# Patient Record
Sex: FEMALE | Race: WHITE | NOT HISPANIC OR LATINO
[De-identification: names, ages, dates, MRNs, and addresses within clinical notes are randomized per-mention and may not be internally consistent; named-entity substitution may affect disease eponyms.]

---

## 2018-01-23 ENCOUNTER — APPOINTMENT (OUTPATIENT)
Dept: ENDOCRINOLOGY | Facility: CLINIC | Age: 68
End: 2018-01-23
Payer: COMMERCIAL

## 2018-01-23 VITALS
HEIGHT: 64 IN | OXYGEN SATURATION: 98 % | WEIGHT: 125 LBS | HEART RATE: 70 BPM | DIASTOLIC BLOOD PRESSURE: 80 MMHG | SYSTOLIC BLOOD PRESSURE: 120 MMHG | BODY MASS INDEX: 21.34 KG/M2

## 2018-01-23 DIAGNOSIS — Z87.891 PERSONAL HISTORY OF NICOTINE DEPENDENCE: ICD-10-CM

## 2018-01-23 DIAGNOSIS — G43.909 MIGRAINE, UNSPECIFIED, NOT INTRACTABLE, W/OUT STATUS MIGRAINOSUS: ICD-10-CM

## 2018-01-23 DIAGNOSIS — Z82.49 FAMILY HISTORY OF ISCHEMIC HEART DISEASE AND OTHER DISEASES OF THE CIRCULATORY SYSTEM: ICD-10-CM

## 2018-01-23 PROCEDURE — 99244 OFF/OP CNSLTJ NEW/EST MOD 40: CPT

## 2018-01-23 RX ORDER — RIZATRIPTAN BENZOATE 10 MG/1
TABLET ORAL
Refills: 0 | Status: ACTIVE | COMMUNITY

## 2018-01-23 RX ORDER — PNV NO.95/FERROUS FUM/FOLIC AC 28MG-0.8MG
TABLET ORAL
Refills: 0 | Status: ACTIVE | COMMUNITY

## 2021-07-14 ENCOUNTER — APPOINTMENT (OUTPATIENT)
Dept: ENDOCRINOLOGY | Facility: CLINIC | Age: 71
End: 2021-07-14
Payer: MEDICARE

## 2021-07-14 VITALS
SYSTOLIC BLOOD PRESSURE: 142 MMHG | OXYGEN SATURATION: 98 % | HEART RATE: 78 BPM | WEIGHT: 126 LBS | HEIGHT: 64 IN | BODY MASS INDEX: 21.51 KG/M2 | TEMPERATURE: 98.1 F | DIASTOLIC BLOOD PRESSURE: 62 MMHG

## 2021-07-14 PROCEDURE — 99204 OFFICE O/P NEW MOD 45 MIN: CPT

## 2021-07-15 NOTE — REASON FOR VISIT
[Follow - Up] : a follow-up visit [Osteoporosis] : osteoporosis [FreeTextEntry2] : Anderson Fisher MD

## 2021-07-15 NOTE — PHYSICAL EXAM
[Alert] : alert [Well Nourished] : well nourished [No Acute Distress] : no acute distress [Well Developed] : well developed [Normal Sclera/Conjunctiva] : normal sclera/conjunctiva [EOMI] : extra ocular movement intact [No Proptosis] : no proptosis [Thyroid Not Enlarged] : the thyroid was not enlarged [No Thyroid Nodules] : no palpable thyroid nodules [Clear to Auscultation] : lungs were clear to auscultation bilaterally [Normal S1, S2] : normal S1 and S2 [Normal Rate] : heart rate was normal [Regular Rhythm] : with a regular rhythm [No Edema] : no peripheral edema [Normal Bowel Sounds] : normal bowel sounds [Not Tender] : non-tender [Not Distended] : not distended [Soft] : abdomen soft [Normal Anterior Cervical Nodes] : no anterior cervical lymphadenopathy [No Spinal Tenderness] : no spinal tenderness [Spine Straight] : spine straight [No Stigmata of Cushings Syndrome] : no stigmata of Cushings Syndrome [Normal Gait] : normal gait [Normal Reflexes] : deep tendon reflexes were 2+ and symmetric [No Tremors] : no tremors [Oriented x3] : oriented to person, place, and time [de-identified] : 2/6 systolic murmur

## 2021-07-15 NOTE — ASSESSMENT
[Bisphosphonate Therapy] : Risks  and benefits of bisphosphonate therapy were  discussed with the patient including gastroesophageal irritation, osteonecrosis of the jaw, and atypical femur fractures, and acute phase reaction [Bisphosphonates] : The patient was instructed to take bisphosphonates on an empty stomach with a full glass of water,and wait at least 30 minutes before eating or lying down [FreeTextEntry1] : 70 year-old female with postmenopausal osteoporosis\par Last seen 1/2018\par \par The patient presented with osteoporosis 2018.  Medical therapy was recommended and the patient declined.\par Repeat bone density 2021 appears to show significantly low bone density in total hip.  I have requested images from [facility] but have not yet received them for review.\par The patient has no interval fractures.. The patient has no suggestion of secondary causes for osteoporosis.  \par \par Patient advised for an increased risk of future fx. Options for medical therapy discussed in detail. I discussed rx with bisphosphonate therapy, including Fosamax, Actonel, Boniva, and IV Reclast. Risks and benefits of bisphosphonate therapy were discussed with the patient including minor aches & pains, heartburn, gastroesophageal irritation (excluding IV Reclast), osteonecrosis of the jaw, atypical femur fractures, and acute phase reaction (w/ IV Reclast only). Pt would benefit from bisphosphonate therapy with the expectation of a drug holiday within 5 years. I discussed rx with twice a year Prolia. Risks and benefits discussed including thigh pain, interval fx, and ONJ. I discussed that pt cannot stop Prolia without expecting rapid bone loss and increase in risk for future fx unless they transition to another rx. Furthermore, there is 10 year safety data for Prolia. All questions were answered. Rx information handout provided. Pt understands and elects to start Actonel. Medication instructions reviewed. Prescription sent out.\par \par I discussed that weight bearing exercise, cardiovascular activities, and diet are beneficial to overall health, but are not adequate for bone density increase or alternative to osteoporosis medication.\par \par I recommend pt take no more than 500 mg Ca in addition to dietary intake and 1000 UI Vitamin D daily.\par \par Labs 2/2021 reviewed: Ca 10.1, normal. Vitamin D 33.4, normal. Creatinine 0.84, normal.\par \par F/u in 4 months

## 2021-07-15 NOTE — HISTORY OF PRESENT ILLNESS
[FreeTextEntry1] : Pt seen 2018\par The patient has been generally good health. She has been told of low bone density in the past consistent with osteopenia. Outside BMD August 2017 reported hip T score -2.6. The spine was reported as normal I assume that this was a false elevation due to arthritis. The patient has not suffered any fractures. The patient has no suggestion of secondary causes for osteoporosis. Pt was recommended to start medical therapy in 1/2018, but pt declined at that time. No further f/u.\par \par Bone mineral density: 5/2021 \par Indication: outside study [facility]\par Spine: -0.6 not accurate due to arthritis\par Total hip: -3.5 osteoporosis, no prior\par Femoral neck: -2.5 osteoporosis, prior -2.6

## 2021-07-15 NOTE — END OF VISIT
[FreeTextEntry3] : I, Shashi Dickerson, authored this note working as a medical scribe for Dr. Walter.  07/14/2021.  4:00PM. This note was authored by the medical scribe for me. I have reviewed, edited, and revised the note as needed. I am in agreement with the exam findings, imaging findings, and treatment plan.  Basil Walter MD

## 2021-07-15 NOTE — CONSULT LETTER
[Dear  ___] : Dear  [unfilled], [Consult Letter:] : I had the pleasure of evaluating your patient, [unfilled]. [Please see my note below.] : Please see my note below. [Consult Closing:] : Thank you very much for allowing me to participate in the care of this patient.  If you have any questions, please do not hesitate to contact me. [FreeTextEntry2] : Anderson Fisher MD\par [address],\[place]  [place] [address]\par [phone]

## 2021-07-15 NOTE — PAST MEDICAL HISTORY
[Menarche Age ____] : age at menarche was [unfilled] [Menopause Age____] : age at menopause was [unfilled] [Total Preg ___] : G[unfilled] [Live Births ___] : P[unfilled]  [History of Hormone Replacement Treatment] : has no history of hormone replacement treatment

## 2021-07-21 ENCOUNTER — TRANSCRIPTION ENCOUNTER (OUTPATIENT)
Age: 71
End: 2021-07-21

## 2021-08-10 ENCOUNTER — TRANSCRIPTION ENCOUNTER (OUTPATIENT)
Age: 71
End: 2021-08-10

## 2021-08-11 ENCOUNTER — TRANSCRIPTION ENCOUNTER (OUTPATIENT)
Age: 71
End: 2021-08-11

## 2021-11-22 ENCOUNTER — APPOINTMENT (OUTPATIENT)
Dept: ENDOCRINOLOGY | Facility: CLINIC | Age: 71
End: 2021-11-22
Payer: MEDICARE

## 2021-11-22 ENCOUNTER — LABORATORY RESULT (OUTPATIENT)
Age: 71
End: 2021-11-22

## 2021-11-22 VITALS
OXYGEN SATURATION: 98 % | TEMPERATURE: 97.2 F | WEIGHT: 125 LBS | DIASTOLIC BLOOD PRESSURE: 78 MMHG | BODY MASS INDEX: 21.46 KG/M2 | SYSTOLIC BLOOD PRESSURE: 138 MMHG | HEART RATE: 75 BPM

## 2021-11-22 PROCEDURE — 99214 OFFICE O/P EST MOD 30 MIN: CPT

## 2021-11-23 LAB
ALBUMIN SERPL ELPH-MCNC: 4.6 G/DL
ALP BLD-CCNC: 91 U/L
ALT SERPL-CCNC: 12 U/L
ANION GAP SERPL CALC-SCNC: 14 MMOL/L
AST SERPL-CCNC: 16 U/L
BILIRUB SERPL-MCNC: 0.3 MG/DL
BUN SERPL-MCNC: 16 MG/DL
CALCIUM SERPL-MCNC: 10.1 MG/DL
CHLORIDE SERPL-SCNC: 100 MMOL/L
CO2 SERPL-SCNC: 27 MMOL/L
CREAT SERPL-MCNC: 0.76 MG/DL
GLUCOSE SERPL-MCNC: 96 MG/DL
POTASSIUM SERPL-SCNC: 4.4 MMOL/L
PROT SERPL-MCNC: 7.5 G/DL
SODIUM SERPL-SCNC: 140 MMOL/L
T3 SERPL-MCNC: 139 NG/DL
T3RU NFR SERPL: 0.9 TBI
T4 SERPL-MCNC: 11.7 UG/DL
THYROGLOB AB SERPL-ACNC: <20 IU/ML
THYROPEROXIDASE AB SERPL IA-ACNC: 20.4 IU/ML
TSH SERPL-ACNC: 0.12 UIU/ML

## 2021-11-23 NOTE — ASSESSMENT
[Bisphosphonate Therapy] : Risks and benefits of bisphosphonate therapy were  discussed with the patient including gastroesophageal irritation, osteonecrosis of the jaw, and atypical femur fractures, and acute phase reaction [Bisphosphonates] : The patient was instructed to take bisphosphonates on an empty stomach with a full glass of water,and wait at least 30 minutes before eating or lying down [FreeTextEntry1] : 70 year-old female with postmenopausal osteoporosis\par Previously seen 1/2018\par \par The patient presented with osteoporosis 2018.  Medical therapy was recommended and the patient declined.\par Repeat bone density 2021 appears to show significantly low bone density in total hip.  I have requested images from [facility] but have not yet received them for review. The patient has no interval fractures. The patient has no suggestion of secondary causes for osteoporosis. Patient advised for an increased risk of future fx. Options for medical therapy discussed in detail. Pt started Actonel 7/2021. Taking correctly, tolerating well. No interval fx, no UGI sx, no thigh pain. No aches & pains. No heartburn. No ONJ. However, pt reports a general "malaise" feeling after each dose, but improved after third pill. Did not take fourth dose.\par \par Recommend pt c/w Actonel. If symptoms persist and or worsen, consider transitioning to Prolia.\par \par Pt states she was recently told of incidentally noted thyroid nodule on physical examination by GYN . Was recommended to have thyroid US. Pt was previously c/o right sided throat tenderness. TUS 11/2021 indicates 2 nodules, one left and one right. It is possible that pt had thyroiditis which may have caused or contributed to her throat tenderness. Repeat TFT, if repeat labs are abnormal, consider FNA biopsy for both nodules w/ Dr. Medrano or Dr. Perera.\par \par Call Dr. Anderson Fisher for labs.\par \par Request labs sent out. Repeat TFT.\par \par F/u in 6 months

## 2021-11-23 NOTE — PROCEDURE
[FreeTextEntry1] : Thyroid US - 11/22/2021\par Left: nodule 9 mm x 14 mm x 24 mm\par Right: nodule triangular in shape 12 mm x 17 mm x 21 mm

## 2021-11-23 NOTE — HISTORY OF PRESENT ILLNESS
[Risedronate (Actonel)] : Risedronate [FreeTextEntry1] : No significant interval health changes. No interval hospitalizations, fractures, or change in medications.\par Pt previously seen in 2018.\par \par Pt started Actonel 7/2021. Taking correctly, tolerating well. No interval fx, no UGI sx, no thigh pain. No aches & pains. No heartburn. Last DDS within past 6 months. No ONJ. Not planning major dental work. However, pt reports a general "malaise" feeling after each dose, but improved after third pill. Did not take fourth dose.\par \par The patient has been generally good health. She has been told of low bone density in the past consistent with osteopenia. Outside BMD August 2017 reported hip T score -2.6. The spine was reported as normal I assume that this was a false elevation due to arthritis. The patient has not suffered any fractures. The patient has no suggestion of secondary causes for osteoporosis. Pt was recommended to start medical therapy in 1/2018, but pt declined at that time. No further f/u.\par \par Bone mineral density: 5/2021 \par Indication: outside study [facility]\par Spine: -0.6 not accurate due to arthritis\par Total hip: -3.5 osteoporosis, no prior\par Femoral neck: -2.5 osteoporosis, prior -2.6\par \par Pt states she was recently told of incidentally noted thyroid nodule on physical examination by GYN . Was recommended to have thyroid US. Pt was previously c/o right sided throat tenderness.\par \par Pt had cataract surgery, no complications but pt c/o of moderate vision changes.

## 2021-11-23 NOTE — PHYSICAL EXAM
[Alert] : alert [Well Nourished] : well nourished [No Acute Distress] : no acute distress [Well Developed] : well developed [Normal Sclera/Conjunctiva] : normal sclera/conjunctiva [EOMI] : extra ocular movement intact [No Proptosis] : no proptosis [Clear to Auscultation] : lungs were clear to auscultation bilaterally [Normal S1, S2] : normal S1 and S2 [Normal Rate] : heart rate was normal [Regular Rhythm] : with a regular rhythm [No Edema] : no peripheral edema [Normal Bowel Sounds] : normal bowel sounds [Not Tender] : non-tender [Not Distended] : not distended [Soft] : abdomen soft [Normal Anterior Cervical Nodes] : no anterior cervical lymphadenopathy [No Spinal Tenderness] : no spinal tenderness [Spine Straight] : spine straight [No Stigmata of Cushings Syndrome] : no stigmata of Cushings Syndrome [Normal Gait] : normal gait [Normal Reflexes] : deep tendon reflexes were 2+ and symmetric [No Tremors] : no tremors [Oriented x3] : oriented to person, place, and time [de-identified] : MNG L>R, right sided tenderness [de-identified] : 2/6 systolic murmur

## 2021-11-23 NOTE — END OF VISIT
[FreeTextEntry3] : I, Shashi Dickerson, authored this note working as a medical scribe for Dr. Walter.  11/22/2021. 12:30PM.  This note was authored by the medical scribe for me. I have reviewed, edited, and revised the note as needed. I am in agreement with the exam findings, imaging findings, and treatment plan.  Basil Walter MD

## 2021-12-15 ENCOUNTER — APPOINTMENT (OUTPATIENT)
Dept: ENDOCRINOLOGY | Facility: CLINIC | Age: 71
End: 2021-12-15

## 2022-01-20 ENCOUNTER — TRANSCRIPTION ENCOUNTER (OUTPATIENT)
Age: 72
End: 2022-01-20

## 2022-01-21 ENCOUNTER — TRANSCRIPTION ENCOUNTER (OUTPATIENT)
Age: 72
End: 2022-01-21

## 2022-04-26 ENCOUNTER — APPOINTMENT (OUTPATIENT)
Dept: ENDOCRINOLOGY | Facility: CLINIC | Age: 72
End: 2022-04-26
Payer: MEDICARE

## 2022-04-26 ENCOUNTER — LABORATORY RESULT (OUTPATIENT)
Age: 72
End: 2022-04-26

## 2022-04-26 VITALS
SYSTOLIC BLOOD PRESSURE: 168 MMHG | RESPIRATION RATE: 16 BRPM | OXYGEN SATURATION: 99 % | HEART RATE: 63 BPM | TEMPERATURE: 97.2 F | HEIGHT: 64 IN | WEIGHT: 124 LBS | BODY MASS INDEX: 21.17 KG/M2 | DIASTOLIC BLOOD PRESSURE: 80 MMHG

## 2022-04-26 PROCEDURE — 99214 OFFICE O/P EST MOD 30 MIN: CPT

## 2022-04-27 LAB
ALBUMIN SERPL ELPH-MCNC: 5 G/DL
ALP BLD-CCNC: 89 U/L
ALT SERPL-CCNC: 22 U/L
ANION GAP SERPL CALC-SCNC: 11 MMOL/L
AST SERPL-CCNC: 25 U/L
BILIRUB SERPL-MCNC: 0.3 MG/DL
BUN SERPL-MCNC: 20 MG/DL
CALCIUM SERPL-MCNC: 10 MG/DL
CHLORIDE SERPL-SCNC: 103 MMOL/L
CO2 SERPL-SCNC: 30 MMOL/L
CREAT SERPL-MCNC: 0.89 MG/DL
EGFR: 69 ML/MIN/1.73M2
GLUCOSE SERPL-MCNC: 97 MG/DL
POTASSIUM SERPL-SCNC: 4.3 MMOL/L
PROT SERPL-MCNC: 6.9 G/DL
SODIUM SERPL-SCNC: 144 MMOL/L
T3 SERPL-MCNC: 89 NG/DL
T3RU NFR SERPL: 1.1 TBI
T4 SERPL-MCNC: 6.9 UG/DL
TSH SERPL-ACNC: 3.05 UIU/ML

## 2022-04-27 NOTE — PROCEDURE
[FreeTextEntry1] : Thyroid US - 04/26/2022\par no distinct nodules\par \par Thyroid US - 11/22/2021\par Left: nodule 9 mm x 14 mm x 24 mm\par Right: nodule triangular in shape 12 mm x 17 mm x 21 mm

## 2022-04-27 NOTE — PHYSICAL EXAM
[Alert] : alert [Well Nourished] : well nourished [No Acute Distress] : no acute distress [Well Developed] : well developed [Normal Sclera/Conjunctiva] : normal sclera/conjunctiva [EOMI] : extra ocular movement intact [No Proptosis] : no proptosis [Clear to Auscultation] : lungs were clear to auscultation bilaterally [Normal S1, S2] : normal S1 and S2 [Normal Rate] : heart rate was normal [Regular Rhythm] : with a regular rhythm [No Edema] : no peripheral edema [Normal Bowel Sounds] : normal bowel sounds [Not Tender] : non-tender [Not Distended] : not distended [Soft] : abdomen soft [Normal Anterior Cervical Nodes] : no anterior cervical lymphadenopathy [No Spinal Tenderness] : no spinal tenderness [Spine Straight] : spine straight [No Stigmata of Cushings Syndrome] : no stigmata of Cushings Syndrome [Normal Gait] : normal gait [Normal Reflexes] : deep tendon reflexes were 2+ and symmetric [No Tremors] : no tremors [Oriented x3] : oriented to person, place, and time [de-identified] : YESICA L>R [de-identified] : 2/6 systolic murmur

## 2022-04-27 NOTE — END OF VISIT
[FreeTextEntry3] : I, Shashi Dickerson, authored this note working as a medical scribe for Dr. Walter.  04/26/2022.  3:15PM. This note was authored by the medical scribe for me. I have reviewed, edited, and revised the note as needed. I am in agreement with the exam findings, imaging findings, and treatment plan.  Basil Walter MD

## 2022-04-27 NOTE — ASSESSMENT
[Bisphosphonate Therapy] : Risks and benefits of bisphosphonate therapy were  discussed with the patient including gastroesophageal irritation, osteonecrosis of the jaw, and atypical femur fractures, and acute phase reaction [Bisphosphonates] : The patient was instructed to take bisphosphonates on an empty stomach with a full glass of water,and wait at least 30 minutes before eating or lying down [FreeTextEntry1] : 71 year-old female with postmenopausal osteoporosis\par \par The patient presented with osteoporosis 2018.  Medical therapy was recommended and the patient declined.\par Repeat bone density 2021 appears to show significantly low bone density in total hip. The patient has no interval fractures. The patient has no suggestion of secondary causes for osteoporosis. Patient advised for an increased risk of future fx. Options for medical therapy discussed in detail. Pt started Actonel 7/2021. Taking correctly, tolerating well. No interval fx, no UGI sx, no thigh pain. No aches & pains. No heartburn. No ONJ. However, pt reports a general "malaise" feeling after each dose, but improved after third pill. Did not take fourth dose. I recommended pt c/w Actonel. Pt took 3 more doses but still does not feel well after each dose. D/c Actonel. Consider transitioning to Prolia at next visit.\par \par Pt states she was told of incidentally noted thyroid nodule on physical examination by GYN. Was recommended to have thyroid US. TUS 11/2021 indicates 2 nodules, one left and one right. It is possible that pt had thyroiditis which may have caused or contributed to her throat tenderness. Repeat TUS 4/2022 indicates no distinct nodules. If repeat TFT is abnormal, we will consider a thyroid nuclear medicine scan.\par \par Request labs sent out.\par \par F/u in 6 months

## 2022-04-27 NOTE — HISTORY OF PRESENT ILLNESS
[Risedronate (Actonel)] : Risedronate [FreeTextEntry1] : No significant interval health changes. No interval surgery, hospitalizations, fractures, or change in medications.\par Pt previously seen in 2018, returned 7/2021.\par \par The patient has been generally good health. She has been told of low bone density in the past consistent with osteopenia. Outside BMD 8/2017 reported hip T score -2.6. The spine was reported as normal I assume that this was a false elevation due to arthritis. The patient has not suffered any fractures. The patient has no suggestion of secondary causes for osteoporosis. Pt was recommended to start medical therapy in 1/2018, but pt declined at that time. No further f/u. Pt started Actonel 7/2021. Taking correctly, tolerating well. No interval fx, no UGI sx, no thigh pain. No aches & pains. No heartburn. Last DDS within past 6 months. No ONJ. Not planning major dental work. However, pt reports a general "malaise" feeling after each dose, but improved after third pill. Did not take fourth dose. I recommended pt c/w Actonel. Pt took 3 more doses but still does not feel well after each dose.\par \par Bone mineral density: 5/2021 \par Indication: outside study [facility]\par Spine: -0.6 not accurate due to arthritis\par Total hip: -3.5 osteoporosis, no prior\par Femoral neck: -2.5 osteoporosis, prior -2.6\par \par Pt states she was told of incidentally noted thyroid nodule on physical examination by GYN. Was recommended to have thyroid US. Pt was previously c/o right sided throat tenderness. TUS 11/2021 indicates 2 nodules, one left and one right. It is possible that pt had thyroiditis which may have caused or contributed to her throat tenderness.\par \par H/o cataract surgery, no complications.

## 2022-04-29 ENCOUNTER — TRANSCRIPTION ENCOUNTER (OUTPATIENT)
Age: 72
End: 2022-04-29

## 2022-04-29 LAB — TSH RECEPTOR AB: <1.1 IU/L

## 2022-06-08 ENCOUNTER — APPOINTMENT (OUTPATIENT)
Dept: ENDOCRINOLOGY | Facility: CLINIC | Age: 72
End: 2022-06-08
Payer: MEDICARE

## 2022-06-08 VITALS
DIASTOLIC BLOOD PRESSURE: 90 MMHG | BODY MASS INDEX: 21.34 KG/M2 | RESPIRATION RATE: 16 BRPM | WEIGHT: 125 LBS | HEART RATE: 64 BPM | HEIGHT: 64 IN | SYSTOLIC BLOOD PRESSURE: 164 MMHG | OXYGEN SATURATION: 98 % | TEMPERATURE: 97.7 F

## 2022-06-08 PROCEDURE — 77080 DXA BONE DENSITY AXIAL: CPT

## 2022-06-08 PROCEDURE — ZZZZZ: CPT

## 2022-06-08 PROCEDURE — 99214 OFFICE O/P EST MOD 30 MIN: CPT | Mod: 25

## 2022-06-08 NOTE — PHYSICAL EXAM
[Alert] : alert [Well Nourished] : well nourished [No Acute Distress] : no acute distress [Well Developed] : well developed [Normal Sclera/Conjunctiva] : normal sclera/conjunctiva [EOMI] : extra ocular movement intact [No Proptosis] : no proptosis [Clear to Auscultation] : lungs were clear to auscultation bilaterally [Normal S1, S2] : normal S1 and S2 [Normal Rate] : heart rate was normal [Regular Rhythm] : with a regular rhythm [No Edema] : no peripheral edema [Normal Bowel Sounds] : normal bowel sounds [Not Tender] : non-tender [Not Distended] : not distended [Soft] : abdomen soft [Normal Anterior Cervical Nodes] : no anterior cervical lymphadenopathy [No Spinal Tenderness] : no spinal tenderness [Spine Straight] : spine straight [No Stigmata of Cushings Syndrome] : no stigmata of Cushings Syndrome [Normal Gait] : normal gait [Normal Reflexes] : deep tendon reflexes were 2+ and symmetric [No Tremors] : no tremors [Oriented x3] : oriented to person, place, and time [de-identified] : YESICA L>R [de-identified] : 2/6 systolic murmur

## 2022-06-08 NOTE — PROCEDURE
[FreeTextEntry1] : thyroid ultrasound 6/7/22\par Normal ultrasound study, no nodules.\par \par Bone mineral density: 06/08/2022\par indication: Compared to outside study 2021 assess response to medication\par spine not performed\par total hip -3.2 osteoporosis prior report -3.5\par femoral neck -2.7 osteoporosis prior report -2.5\par proximal radius -4.1 severe osteoporosis no prior reportq\par \par Thyroid US - 04/26/2022\par no distinct nodules\par \par Thyroid US - 11/22/2021\par Left: nodule 9 mm x 14 mm x 24 mm\par Right: nodule triangular in shape 12 mm x 17 mm x 21 mm

## 2022-06-08 NOTE — ASSESSMENT
[Bisphosphonate Therapy] : Risks and benefits of bisphosphonate therapy were  discussed with the patient including gastroesophageal irritation, osteonecrosis of the jaw, and atypical femur fractures, and acute phase reaction [Bisphosphonates] : The patient was instructed to take bisphosphonates on an empty stomach with a full glass of water,and wait at least 30 minutes before eating or lying down [FreeTextEntry1] : 71 year-old female with postmenopausal osteoporosis\par \par The patient presented with osteoporosis 2018.  Medical therapy was recommended and the patient declined.\par Repeat bone density 2021 appears to show significantly low bone density in total hip. \par Patient began Actonel but did not tolerate due to nonspecific malaise.\par Repeat bone density 2022 shows essentially stable osteoporosis hip.  Proximal radius shows severe osteoporosis no prior report for comparison.  No history of hyperparathyroidism but will repeat blood test today.\par Options of medical therapy discussed in great detail.  I strongly recommend transition to Prolia 60 mg twice a year.  Risk and benefits discussed in detail.\par Patient will inform me if she wants to schedule Prolia.\par Pt states she was told of incidentally noted thyroid nodule on physical examination by GYN. Was recommended to have thyroid US. TUS 11/2021 indicates 2 nodules, one left and one right.  Current physical examination ultrasound examination show no goiter or nodules.  Patient is clinically euthyroid.  This appears to have been a self-limited thyroiditis which resolved.\par \par

## 2022-06-09 LAB
25(OH)D3 SERPL-MCNC: 48 NG/ML
ALBUMIN SERPL ELPH-MCNC: 4.9 G/DL
ALP BLD-CCNC: 82 U/L
ALT SERPL-CCNC: 29 U/L
ANION GAP SERPL CALC-SCNC: 14 MMOL/L
AST SERPL-CCNC: 29 U/L
BILIRUB SERPL-MCNC: 0.3 MG/DL
BUN SERPL-MCNC: 16 MG/DL
CALCIUM SERPL-MCNC: 10.4 MG/DL
CALCIUM SERPL-MCNC: 10.4 MG/DL
CHLORIDE SERPL-SCNC: 105 MMOL/L
CO2 SERPL-SCNC: 28 MMOL/L
CREAT SERPL-MCNC: 0.82 MG/DL
EGFR: 76 ML/MIN/1.73M2
GLUCOSE SERPL-MCNC: 99 MG/DL
PARATHYROID HORMONE INTACT: 41 PG/ML
PHOSPHATE SERPL-MCNC: 3.5 MG/DL
POTASSIUM SERPL-SCNC: 5.5 MMOL/L
PROT SERPL-MCNC: 7.2 G/DL
SODIUM SERPL-SCNC: 146 MMOL/L
TSH SERPL-ACNC: 2.58 UIU/ML

## 2022-07-06 ENCOUNTER — APPOINTMENT (OUTPATIENT)
Dept: ENDOCRINOLOGY | Facility: CLINIC | Age: 72
End: 2022-07-06

## 2022-11-08 ENCOUNTER — APPOINTMENT (OUTPATIENT)
Dept: ENDOCRINOLOGY | Facility: CLINIC | Age: 72
End: 2022-11-08

## 2022-11-10 ENCOUNTER — APPOINTMENT (OUTPATIENT)
Dept: ENDOCRINOLOGY | Facility: CLINIC | Age: 72
End: 2022-11-10

## 2022-11-10 VITALS
HEART RATE: 67 BPM | WEIGHT: 123 LBS | OXYGEN SATURATION: 98 % | BODY MASS INDEX: 21.11 KG/M2 | SYSTOLIC BLOOD PRESSURE: 148 MMHG | DIASTOLIC BLOOD PRESSURE: 92 MMHG | TEMPERATURE: 97.2 F

## 2022-11-10 PROCEDURE — 99214 OFFICE O/P EST MOD 30 MIN: CPT | Mod: 25

## 2022-11-10 PROCEDURE — 96372 THER/PROPH/DIAG INJ SC/IM: CPT

## 2022-11-10 RX ORDER — DENOSUMAB 60 MG/ML
60 INJECTION SUBCUTANEOUS
Qty: 1 | Refills: 0 | Status: COMPLETED | OUTPATIENT
Start: 2022-11-10

## 2022-11-10 RX ADMIN — DENOSUMAB 60 MG/ML: 60 INJECTION SUBCUTANEOUS at 00:00

## 2022-11-11 RX ORDER — RISEDRONATE SODIUM 150 MG/1
150 TABLET, FILM COATED ORAL
Qty: 1 | Refills: 3 | Status: DISCONTINUED | COMMUNITY
Start: 2021-07-14 | End: 2022-11-11

## 2022-11-11 NOTE — PHYSICAL EXAM
[Alert] : alert [Well Nourished] : well nourished [No Acute Distress] : no acute distress [Well Developed] : well developed [Normal Sclera/Conjunctiva] : normal sclera/conjunctiva [EOMI] : extra ocular movement intact [No Proptosis] : no proptosis [Clear to Auscultation] : lungs were clear to auscultation bilaterally [Normal S1, S2] : normal S1 and S2 [Normal Rate] : heart rate was normal [Regular Rhythm] : with a regular rhythm [No Edema] : no peripheral edema [Normal Bowel Sounds] : normal bowel sounds [Not Tender] : non-tender [Not Distended] : not distended [Soft] : abdomen soft [Normal Anterior Cervical Nodes] : no anterior cervical lymphadenopathy [No Spinal Tenderness] : no spinal tenderness [Spine Straight] : spine straight [No Stigmata of Cushings Syndrome] : no stigmata of Cushings Syndrome [Normal Gait] : normal gait [Normal Reflexes] : deep tendon reflexes were 2+ and symmetric [No Tremors] : no tremors [Oriented x3] : oriented to person, place, and time [de-identified] : YESICA L>R [de-identified] : 2/6 systolic murmur

## 2022-11-11 NOTE — ASSESSMENT
[Bisphosphonate Therapy] : Risks and benefits of bisphosphonate therapy were  discussed with the patient including gastroesophageal irritation, osteonecrosis of the jaw, and atypical femur fractures, and acute phase reaction [Bisphosphonates] : The patient was instructed to take bisphosphonates on an empty stomach with a full glass of water,and wait at least 30 minutes before eating or lying down [FreeTextEntry1] : 71 year-old female with postmenopausal osteoporosis\par \par The patient presented with osteoporosis 2018.  Medical therapy was recommended and the patient declined.\par Repeat bone density 2021 appears to show significantly low bone density in total hip. \par Patient began Actonel but did not tolerate due to nonspecific malaise.\par Repeat bone density 2022 shows essentially stable osteoporosis hip.  Proximal radius shows severe osteoporosis no prior report for comparison.  No history of hyperparathyroidism but will repeat blood test today.\par Options of medical therapy discussed in great detail.  I strongly recommend transition to Prolia 60 mg twice a year.  Risk and benefits discussed in detail. 1st dose of Prolia given today . Buy and bill \par \par Pt states she was told of incidentally noted thyroid nodule on physical examination by GYN. Was recommended to have thyroid US. TUS 11/2021 indicates 2 nodules, one left and one right.  Current physical examination ultrasound examination show no goiter or nodules.  Patient is clinically euthyroid.  This appears to have been a self-limited thyroiditis which resolved.\par \par \par Follow up in 6 months \par

## 2022-11-11 NOTE — PROCEDURE
[FreeTextEntry1] : Bone mineral density: 5/2021 \par Indication: outside study [facility]\par Spine: -0.6 not accurate due to arthritis\par Total hip: -3.5 osteoporosis, no prior\par Femoral neck: -2.5 osteoporosis, prior -2.6thyroid ultrasound 6/7/22\par Normal ultrasound study, no nodules.\par \par Bone mineral density: 06/08/2022\par indication: Compared to outside study 2021 assess response to medication\par spine not performed\par total hip -3.2 osteoporosis prior report -3.5\par femoral neck -2.7 osteoporosis prior report -2.5\par proximal radius -4.1 severe osteoporosis no prior reportq\par \par Thyroid US - 04/26/2022\par no distinct nodules\par \par Thyroid US - 11/22/2021\par Left: nodule 9 mm x 14 mm x 24 mm\par Right: nodule triangular in shape 12 mm x 17 mm x 21 mm

## 2022-11-11 NOTE — HISTORY OF PRESENT ILLNESS
[FreeTextEntry1] : Patient returns for a follow up visit for osteoporosis. Since the last visit pt has no significant interval health changes. No interval surgery, hospitalizations, fractures, or change in medications.  \par Pt previously seen in 2018, returned 7/2021.\par \par The patient has been generally good health. She has been told of low bone density in the past consistent with osteopenia. Outside BMD 8/2017 reported hip T score -2.6. The spine was reported as normal I assume that this was a false elevation due to arthritis. The patient has not suffered any fractures. The patient has no suggestion of secondary causes for osteoporosis. Pt was recommended to start medical therapy in 1/2018, but pt declined at that time. No further f/u. Pt started Actonel 7/2021.\par Stopped Actonel due to  a general "malaise" feeling after each dose,\par \par Pt states she was told of incidentally noted thyroid nodule on physical examination by GYN. Was recommended to have thyroid US. Pt was previously c/o right sided throat tenderness. TUS 11/2021 indicates 2 nodules, one left and one right. It is possible that pt had thyroiditis which may have caused or contributed to her throat tenderness.\par Repeat examination and thyroid functions March 2022 were normal.  Patient has no current symptoms suggestive of thyroid disease.\par H/o cataract surgery, no complications.

## 2022-11-15 ENCOUNTER — TRANSCRIPTION ENCOUNTER (OUTPATIENT)
Age: 72
End: 2022-11-15

## 2023-05-18 ENCOUNTER — APPOINTMENT (OUTPATIENT)
Dept: ENDOCRINOLOGY | Facility: CLINIC | Age: 73
End: 2023-05-18
Payer: MEDICARE

## 2023-05-18 VITALS
OXYGEN SATURATION: 98 % | SYSTOLIC BLOOD PRESSURE: 130 MMHG | HEART RATE: 64 BPM | WEIGHT: 125 LBS | DIASTOLIC BLOOD PRESSURE: 82 MMHG | HEIGHT: 64 IN | BODY MASS INDEX: 21.34 KG/M2

## 2023-05-18 DIAGNOSIS — Z00.00 ENCOUNTER FOR GENERAL ADULT MEDICAL EXAMINATION W/OUT ABNORMAL FINDINGS: ICD-10-CM

## 2023-05-18 PROCEDURE — 99214 OFFICE O/P EST MOD 30 MIN: CPT | Mod: 25

## 2023-05-18 PROCEDURE — 96372 THER/PROPH/DIAG INJ SC/IM: CPT

## 2023-05-18 RX ORDER — DENOSUMAB 60 MG/ML
60 INJECTION SUBCUTANEOUS
Qty: 1 | Refills: 0 | Status: COMPLETED | OUTPATIENT
Start: 2023-05-18

## 2023-05-18 RX ADMIN — DENOSUMAB 60 MG/ML: 60 INJECTION SUBCUTANEOUS at 00:00

## 2023-05-19 LAB
ALBUMIN SERPL ELPH-MCNC: 4.8 G/DL
ALP BLD-CCNC: 65 U/L
ALT SERPL-CCNC: 27 U/L
ANION GAP SERPL CALC-SCNC: 12 MMOL/L
AST SERPL-CCNC: 26 U/L
BILIRUB SERPL-MCNC: 0.3 MG/DL
BUN SERPL-MCNC: 18 MG/DL
CALCIUM SERPL-MCNC: 10 MG/DL
CHLORIDE SERPL-SCNC: 106 MMOL/L
CO2 SERPL-SCNC: 28 MMOL/L
CREAT SERPL-MCNC: 0.78 MG/DL
EGFR: 81 ML/MIN/1.73M2
GLUCOSE SERPL-MCNC: 90 MG/DL
POTASSIUM SERPL-SCNC: 4.5 MMOL/L
PROT SERPL-MCNC: 7.1 G/DL
SODIUM SERPL-SCNC: 145 MMOL/L

## 2023-05-19 NOTE — PHYSICAL EXAM
[Alert] : alert [Well Nourished] : well nourished [No Acute Distress] : no acute distress [Well Developed] : well developed [Normal Sclera/Conjunctiva] : normal sclera/conjunctiva [EOMI] : extra ocular movement intact [No Proptosis] : no proptosis [Clear to Auscultation] : lungs were clear to auscultation bilaterally [Normal S1, S2] : normal S1 and S2 [Normal Rate] : heart rate was normal [Regular Rhythm] : with a regular rhythm [No Edema] : no peripheral edema [Normal Bowel Sounds] : normal bowel sounds [Not Tender] : non-tender [Not Distended] : not distended [Soft] : abdomen soft [Normal Anterior Cervical Nodes] : no anterior cervical lymphadenopathy [No Spinal Tenderness] : no spinal tenderness [Spine Straight] : spine straight [No Stigmata of Cushings Syndrome] : no stigmata of Cushings Syndrome [Normal Gait] : normal gait [Normal Reflexes] : deep tendon reflexes were 2+ and symmetric [No Tremors] : no tremors [Oriented x3] : oriented to person, place, and time [de-identified] : YESICA L>R [de-identified] : 2/6 systolic murmur

## 2023-05-19 NOTE — PROCEDURE
[FreeTextEntry1] : \par thyroid ultrasound 6/7/22\par Normal ultrasound study, no nodules.\par \par Bone mineral density: 06/08/2022\par indication: Compared to outside study 2021 assess response to medication\par spine not performed\par total hip -3.2 osteoporosis prior report -3.5\par femoral neck -2.7 osteoporosis prior report -2.5\par proximal radius -4.1 severe osteoporosis no prior report\par \par Thyroid US - 04/26/2022\par no distinct nodules\par \par Bone mineral density: 5/2021 \par Indication: outside study [facility]\par Spine: -0.6 not accurate due to arthritis\par Total hip: -3.5 osteoporosis, no prior\par Femoral neck: -2.5 osteoporosis, prior -2.6\par \par \par Thyroid US - 11/22/2021\par Left: nodule 9 mm x 14 mm x 24 mm\par Right: nodule triangular in shape 12 mm x 17 mm x 21 mm

## 2023-05-19 NOTE — HISTORY OF PRESENT ILLNESS
[FreeTextEntry1] : \par The patient presented with osteoporosis 2018.  Medical therapy was recommended and the patient declined.\par Repeat bone density 2021 appears to show significantly low bone density in total hip. \par Patient began Actonel but did not tolerate due to nonspecific malaise.\par Repeat bone density 2022 shows essentially stable osteoporosis hip.  Proximal radius shows severe osteoporosis no prior report for comparison. PTh normal\par Options of medical therapy discussed in great detail.  I strongly recommend transition to Prolia 60 mg twice a year.  Risk and benefits discussed in detail\par \par Had first dose of Prolia Nov 2022. had some back pain and knee pain no interval fractures.  No interval major health changes surgeries hospitalizations or other change in medication.  Up-to-date with dentist.\par \par \par Pt states she was told of incidentally noted thyroid nodule on physical examination by GYN. Was recommended to have thyroid US. Pt was previously c/o right sided throat tenderness. TUS 11/2021 indicates 2 nodules, one left and one right. It is possible that pt had thyroiditis which may have caused or contributed to her throat tenderness.\par Repeat examination and thyroid functions March 2022 were normal.  Patient has no current symptoms suggestive of thyroid disease.

## 2023-05-19 NOTE — ASSESSMENT
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  Medical therapy was recommended and the patient declined.\par Repeat bone density 2021 appears to show significantly low bone density in total hip. \par Patient began Actonel but did not tolerate due to nonspecific malaise.\par Repeat bone density 2022 shows essentially stable osteoporosis hip.  Proximal radius shows severe osteoporosis no prior report for comparison.  No history of hyperparathyroidism .\par \par Had first dose of Prolia Nov 2022. had some back pain and knee pain .  I advised that this is unlikely to be a real adverse effect. \par Check collagen cross-links to assess efficacy of anti-resorptive Rx. Pt advised that this may not be covered by insurance. \par  Prolia . Buy and bill \par \par Pt states she was told of incidentally noted thyroid nodule on physical examination by GYN. Was recommended to have thyroid US. TUS 11/2021 indicates 2 nodules, one left and one right.  Current physical examination ultrasound examination show no goiter or nodules.  Patient is clinically euthyroid.  This appears to have been a self-limited thyroiditis which resolved.\par \par Follow up in 6 months \par repeat BMD next visit

## 2023-05-26 LAB — COLLAGEN CTX SERPL-MCNC: 64 PG/ML

## 2023-06-26 ENCOUNTER — NON-APPOINTMENT (OUTPATIENT)
Age: 73
End: 2023-06-26

## 2023-06-30 LAB
ALBUMIN SERPL ELPH-MCNC: 4.8 G/DL
ANION GAP SERPL CALC-SCNC: 12 MMOL/L
APPEARANCE: CLEAR
BACTERIA: NEGATIVE /HPF
BILIRUBIN URINE: NEGATIVE
BLOOD URINE: NEGATIVE
BUN SERPL-MCNC: 22 MG/DL
CALCIUM SERPL-MCNC: 9.7 MG/DL
CAST: 0 /LPF
CHLORIDE SERPL-SCNC: 101 MMOL/L
CO2 SERPL-SCNC: 28 MMOL/L
COLOR: YELLOW
CREAT SERPL-MCNC: 0.83 MG/DL
CREAT SPEC-SCNC: 16 MG/DL
CREAT SPEC-SCNC: 16 MG/DL
CREAT/PROT UR: 0.3 RATIO
EGFR: 75 ML/MIN/1.73M2
EPITHELIAL CELLS: 1 /HPF
GLUCOSE QUALITATIVE U: NEGATIVE MG/DL
GLUCOSE SERPL-MCNC: 83 MG/DL
KETONES URINE: NEGATIVE MG/DL
LEUKOCYTE ESTERASE URINE: NEGATIVE
MICROALBUMIN 24H UR DL<=1MG/L-MCNC: <1.2 MG/DL
MICROALBUMIN/CREAT 24H UR-RTO: NORMAL MG/G
MICROSCOPIC-UA: NORMAL
NITRITE URINE: NEGATIVE
PH URINE: 7
PHOSPHATE SERPL-MCNC: 3.4 MG/DL
POTASSIUM SERPL-SCNC: 5.4 MMOL/L
PROT UR-MCNC: 4 MG/DL
PROTEIN URINE: NEGATIVE MG/DL
RED BLOOD CELLS URINE: 0 /HPF
SODIUM SERPL-SCNC: 141 MMOL/L
SPECIFIC GRAVITY URINE: 1.01
UROBILINOGEN URINE: 0.2 MG/DL
WHITE BLOOD CELLS URINE: 0 /HPF

## 2023-07-03 ENCOUNTER — APPOINTMENT (OUTPATIENT)
Dept: UROGYNECOLOGY | Facility: CLINIC | Age: 73
End: 2023-07-03
Payer: MEDICARE

## 2023-07-03 ENCOUNTER — APPOINTMENT (OUTPATIENT)
Dept: NEPHROLOGY | Facility: CLINIC | Age: 73
End: 2023-07-03
Payer: MEDICARE

## 2023-07-03 VITALS
SYSTOLIC BLOOD PRESSURE: 186 MMHG | BODY MASS INDEX: 21.34 KG/M2 | HEIGHT: 64 IN | WEIGHT: 125 LBS | DIASTOLIC BLOOD PRESSURE: 99 MMHG | HEART RATE: 70 BPM

## 2023-07-03 DIAGNOSIS — N81.2 INCOMPLETE UTEROVAGINAL PROLAPSE: ICD-10-CM

## 2023-07-03 DIAGNOSIS — N17.9 ACUTE KIDNEY FAILURE, UNSPECIFIED: ICD-10-CM

## 2023-07-03 DIAGNOSIS — Z82.49 FAMILY HISTORY OF ISCHEMIC HEART DISEASE AND OTHER DISEASES OF THE CIRCULATORY SYSTEM: ICD-10-CM

## 2023-07-03 DIAGNOSIS — N39.41 URGE INCONTINENCE: ICD-10-CM

## 2023-07-03 DIAGNOSIS — Z83.511 FAMILY HISTORY OF GLAUCOMA: ICD-10-CM

## 2023-07-03 DIAGNOSIS — N36.41 HYPERMOBILITY OF URETHRA: ICD-10-CM

## 2023-07-03 DIAGNOSIS — N81.11 CYSTOCELE, MIDLINE: ICD-10-CM

## 2023-07-03 DIAGNOSIS — Z83.6 FAMILY HISTORY OF OTHER DISEASES OF THE RESPIRATORY SYSTEM: ICD-10-CM

## 2023-07-03 LAB
BILIRUB UR QL STRIP: NORMAL
CLARITY UR: CLEAR
COLLECTION METHOD: NORMAL
GLUCOSE UR-MCNC: NORMAL
HCG UR QL: 0.2 EU/DL
HGB UR QL STRIP.AUTO: NORMAL
KETONES UR-MCNC: NORMAL
LEUKOCYTE ESTERASE UR QL STRIP: NORMAL
NITRITE UR QL STRIP: NORMAL
PH UR STRIP: 6.5
PROT UR STRIP-MCNC: NORMAL
SP GR UR STRIP: 1.01

## 2023-07-03 PROCEDURE — 51701 INSERT BLADDER CATHETER: CPT

## 2023-07-03 PROCEDURE — 99203 OFFICE O/P NEW LOW 30 MIN: CPT | Mod: 25

## 2023-07-03 PROCEDURE — 99204 OFFICE O/P NEW MOD 45 MIN: CPT | Mod: 95

## 2023-07-03 PROCEDURE — 81003 URINALYSIS AUTO W/O SCOPE: CPT | Mod: QW

## 2023-07-03 NOTE — PROCEDURE
[FreeTextEntry1] : A catheterized urine specimen was collected to rule out urinary tract infection and/or retention. \par

## 2023-07-03 NOTE — REASON FOR VISIT
[Questionnaire Received] : Patient questionnaire received [Intake Form Reviewed] : Patient intake form with past medical history, surgical history, family history and social history reviewed today [Pelvic Organ Prolapse] : pelvic organ prolapse [Urinary Urgency] : urinary urgency [Pelvic Strengthening] : pelvic strengthening

## 2023-07-03 NOTE — DISCUSSION/SUMMARY
[FreeTextEntry1] : I reviewed the above findings with the patient with visual illustrations. Treatment options for the prolapse were discussed and included doing nothing, Kegel exercises and behavioral modification, a pessary, or surgical correction.Surgically we discussed the abdominal vs the vaginal routes. Abdominally we discussed a hysterectomy and a sacral colpopexy   laparoscopically and robotically.  Vaginally we discussed a vaginal hysterectomy, uterosacral suspension, and anterior/posterior repair. Surgically we discussed hysteropexy as well as the use of biologics. At this time, she would like to proceed with Pelvic Floor PT. \par \par In terms of urinary urgency/UUI, I discussed fluid and behavioral modifications. \par \par IUGA handout on POP, OAB and Kegels. \par \par I discussed finding of trace blood in urine, will send for UA and urine culture. She will follow with PCP for hypertension. She will return in 3 months for follow up and PVR check.  All questions were answered

## 2023-07-03 NOTE — PHYSICAL EXAM
[General Appearance - Alert] : alert [Sclera] : the sclera and conjunctiva were normal [Outer Ear] : the ears and nose were normal in appearance [Nasal Cavity] : the nasal mucosa and septum were normal [Neck Appearance] : the appearance of the neck was normal [Neck Cervical Mass (___cm)] : no neck mass was observed [] : no respiratory distress [Exaggerated Use Of Accessory Muscles For Inspiration] : no accessory muscle use [Abnormal Walk] : normal gait [Musculoskeletal - Swelling] : no joint swelling seen

## 2023-07-03 NOTE — ASSESSMENT
[FreeTextEntry1] : \par 1. Acute kidney injury.\par \par Patient's baseline serum creatinine is 0.8mg/dL, with a corresponding eGFR of around 74 ml/min/1.73m2.  Brief episode of ANDRES (creatinine 1.08) likely due to NSAID use. Serum creatinine is already back to baseline. \par \par In order to slow progression, patient is advised have good blood pressure. Avoid NSAIDS if possible, but if she does take it, limit to only a few days. \par >50% of the encounter was spent discussing about kidney function, stages of CKD, and steps to slow progression of kidney disease. \par \par 2. Hyperkalemia - Borderline. Likely due to a difficult blood draw. Prior lab work showed a normal potassium level. Given that she has a normal kidney function, she does not need to limit potassium in her diet. \par \par \par No further followup needed. \par \par A total of 50 minutes was spent during this visit.\par \par 45 were spent face-to-face with patient, 10  were spent reviewing chart and ordering tests. \par \par

## 2023-07-03 NOTE — HISTORY OF PRESENT ILLNESS
[Cystocele (Obstetric)] : no [Unable To Restrain Bowel Movement] : mild [x1] : nocturia once nightly [Urinary Tract Infection] : mild [Urinary Frequency] : no [] : yes [Uses ___ pads per day] : uses [unfilled] pad(s) per day [Constipation Obstructed Defecation] : no [Stool Visible Blood] : no [Incomplete Emptying Of Stool] : no [FreeTextEntry5] : most days  [de-identified] : 3 [de-identified] : few times per month [de-identified] : 6x [de-identified] : with <50% of voids  [FreeTextEntry1] : Amber is a 74yo presenting with complaints of POP and urinary urgency. She reports she has noticed a bulge for the last several months. She also reports long standing history of urinary urgency. Reports associated UUI episodes a few times per month. Denies any MIRANDA. Reports incomplete emptying at times. She is currently sexually active. \par \par

## 2023-07-03 NOTE — PHYSICAL EXAM
[Chaperone Present] : A chaperone was present in the examining room during all aspects of the physical examination [No Acute Distress] : in no acute distress [Well developed] : well developed [Well Nourished] : ~L well nourished [Warm and Dry] : was warm and dry to touch [Labia Majora] : were normal [Labia Minora] : were normal [Normal Appearance] : general appearance was normal [Aa ____] : Aa [unfilled] [Ba ____] : Ba [unfilled] [C ____] : C [unfilled] [GH ____] : GH [unfilled] [PB ____] : PB [unfilled] [TVL ____] : TVL  [unfilled] [D ____] : D [unfilled] [Normal] : no abnormalities [Post Void Residual ____ml] : post void residual was [unfilled] ml [Tenderness] : ~T no ~M abdominal tenderness observed [Distended] : not distended [de-identified] : patient refused rectal exam [de-identified] : patient refused rectal exam

## 2023-07-03 NOTE — END OF VISIT
Spoke with [facility] patient has refills not sure why they were told different.   [Time Spent: ___ minutes] : I have spent [unfilled] minutes of time on the encounter. [FreeTextEntry3] : Patient seen and examined and discussion by me (Dr. Wade)\par

## 2023-07-03 NOTE — HISTORY OF PRESENT ILLNESS
[Home] : at home, [unfilled] , at the time of the visit. 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 \par \par \par Kidney history:\par Ms. JUNG has a baseline serum creatinine of 0.8mg/dL, with a corresponding eGFR of 70 ml/min/1.73m2.\par Urinalyses showed no rbc/wbc; UPCR showed 0.3 g/g and UACR showed no proteinuria. \par Recently had bloodwork done at PCP's office (Dr. Anderson Fisher) and noted that serum creatinine was elevated to 1.04 on June 14 (eGFR 57) and 1.08 on June 22 (eGFR 54). She states that she was on meloxicam for 5 days about 7-10 days prior to the first blood draw (knee pain), and was also taking 1-2 pills of advil last week. \par \par Repeated blood test on 6/30/23 showed that her serum creatinine had returned back to baseline. \par \par \par She denies having nausea/vomiting/diarrhea. She has a good appetite. She denies hematuria, frothy urine or dysuria. She denies shortness of breath, chest pain, headache, edema. No hand tremors. She  denies skin rash, joint pains or hair loss. She denies NSAID use or herbal supplements.\par No family history of kidney disease.\par \par \par \par

## 2023-07-05 PROBLEM — Z82.49 FAMILY HISTORY OF HYPERTENSION: Status: ACTIVE | Noted: 2023-07-05

## 2023-07-05 PROBLEM — Z83.6 FAMILY HISTORY OF PNEUMONIA: Status: ACTIVE | Noted: 2023-07-05

## 2023-07-05 PROBLEM — Z83.511 FAMILY HISTORY OF GLAUCOMA: Status: ACTIVE | Noted: 2023-07-05

## 2023-07-05 RX ORDER — UBROGEPANT 50 MG/1
TABLET ORAL
Qty: 8 | Refills: 0 | Status: ACTIVE | COMMUNITY
Start: 2022-10-11

## 2023-07-05 RX ORDER — TRIAMCINOLONE ACETONIDE 55 MCG
AEROSOL WITH ADAPTER (GRAM) NASAL
Refills: 0 | Status: ACTIVE | COMMUNITY

## 2023-07-07 ENCOUNTER — NON-APPOINTMENT (OUTPATIENT)
Age: 73
End: 2023-07-07

## 2023-10-24 NOTE — PAST MEDICAL HISTORY
[Menarche Age ____] : age at menarche was [unfilled] [Menopause Age____] : age at menopause was [unfilled] [Total Preg ___] : G[unfilled] [Live Births ___] : P[unfilled]  [History of Hormone Replacement Treatment] : has no history of hormone replacement treatment 24-Oct-2023 09:45

## 2023-11-07 ENCOUNTER — APPOINTMENT (OUTPATIENT)
Dept: UROGYNECOLOGY | Facility: CLINIC | Age: 73
End: 2023-11-07

## 2023-11-16 ENCOUNTER — APPOINTMENT (OUTPATIENT)
Dept: ENDOCRINOLOGY | Facility: CLINIC | Age: 73
End: 2023-11-16
Payer: MEDICARE

## 2023-11-16 VITALS
WEIGHT: 122 LBS | HEIGHT: 64.25 IN | SYSTOLIC BLOOD PRESSURE: 150 MMHG | BODY MASS INDEX: 20.83 KG/M2 | DIASTOLIC BLOOD PRESSURE: 92 MMHG | HEART RATE: 79 BPM | OXYGEN SATURATION: 98 %

## 2023-11-16 DIAGNOSIS — M81.0 AGE-RELATED OSTEOPOROSIS W/OUT CURRENT PATHOLOGICAL FRACTURE: ICD-10-CM

## 2023-11-16 DIAGNOSIS — E04.2 NONTOXIC MULTINODULAR GOITER: ICD-10-CM

## 2023-11-16 PROCEDURE — 99214 OFFICE O/P EST MOD 30 MIN: CPT | Mod: 25

## 2023-11-16 PROCEDURE — 77080 DXA BONE DENSITY AXIAL: CPT

## 2023-11-17 PROBLEM — E04.2 MULTINODULAR GOITER: Status: ACTIVE | Noted: 2021-11-22

## 2023-11-17 RX ORDER — DENOSUMAB 60 MG/ML
60 INJECTION SUBCUTANEOUS
Refills: 0 | Status: DISCONTINUED | COMMUNITY
End: 2023-11-17

## 2023-11-22 RX ORDER — IBANDRONATE SODIUM 150 MG/1
150 TABLET ORAL
Qty: 1 | Refills: 3 | Status: ACTIVE | COMMUNITY
Start: 2023-11-22 | End: 1900-01-01

## 2024-03-05 ENCOUNTER — APPOINTMENT (OUTPATIENT)
Dept: UROGYNECOLOGY | Facility: CLINIC | Age: 74
End: 2024-03-05

## 2024-04-04 ENCOUNTER — APPOINTMENT (OUTPATIENT)
Dept: ENDOCRINOLOGY | Facility: CLINIC | Age: 74
End: 2024-04-04

## 2024-09-23 ENCOUNTER — NON-APPOINTMENT (OUTPATIENT)
Age: 74
End: 2024-09-23

## 2024-09-24 ENCOUNTER — APPOINTMENT (OUTPATIENT)
Dept: ORTHOPEDIC SURGERY | Facility: CLINIC | Age: 74
End: 2024-09-24
Payer: MEDICARE

## 2024-09-24 ENCOUNTER — NON-APPOINTMENT (OUTPATIENT)
Age: 74
End: 2024-09-24

## 2024-09-24 VITALS — HEIGHT: 64 IN | BODY MASS INDEX: 21.17 KG/M2 | WEIGHT: 124 LBS

## 2024-09-24 DIAGNOSIS — M17.12 UNILATERAL PRIMARY OSTEOARTHRITIS, LEFT KNEE: ICD-10-CM

## 2024-09-24 PROCEDURE — 73564 X-RAY EXAM KNEE 4 OR MORE: CPT | Mod: LT

## 2024-09-24 PROCEDURE — 99204 OFFICE O/P NEW MOD 45 MIN: CPT

## 2024-09-24 NOTE — PHYSICAL EXAM
[de-identified] : Constitutional o Appearance : well-nourished, well developed, alert, in no acute distress  Head and Face o Head :  Inspection : atraumatic, normocephalic o Face :  Inspection : no visible rash or discoloration Respiratory o Respiratory Effort: breathing unlabored  Neurologic o Mental Status Examination :  Orientation : alert and oriented X 3 Psychiatric o Mood and Affect: mood normal, affect appropriate Cardiovascular o Observation/Palpation : - no swelling Lymphatic o Additional Nodes : No palpable lymph nodes present  Right Lower Extremity o Buttock : no tenderness, swelling or deformities  o Right Hip :  Inspection/Palpation : no tenderness, swelling or deformities  Range of Motion : full and painless in all planes, no crepitance  Stability : joint stability intact  Strength : extension, flexion, adduction, abduction, internal rotation and external rotation 5/5   o Right Knee :  Inspection/Palpation : no tenderness to palpation, no swelling  Range of Motion : active flexion and extension full and painless, no crepitance  Stability : no valgus or varus instability present on provocative testing  Strength : flexion and extension 5/5  Tests and Signs : negative Anterior Drawer, negative Lachman, negative George  Left Lower Extremity o Buttock : no tenderness, swelling or deformities  o Left Hip :  Inspection/Palpation : no tenderness, no swelling or deformities  Range of Motion : full and painless in all planes, no crepitance  Stability : joint stability intact  Strength : extension, flexion, adduction, abduction, internal rotation and external rotation 5/5  o Left Knee :  Inspection/Palpation : medial compartment tenderness and lateral compartment tenderness  to palpation, no swelling  Range of Motion : 0-125 with pain on active flexion, no crepitance  Stability : no valgus or varus instability present on provocative testing  Strength : flexion and extension 5/5  Tests and Signs : negative Anterior Drawer, negative Lachman, negative George  Gait and Station: Gait: has an  brace for left knee, no significant extremity swelling or lymphedema, good proprioception and balance, valgus deformity while standing  Radiology Results 9/24/2024 o Left Knee : Standing AP, lateral, tunnel, and skyline views of the knee were obtained and reveal bone on bone in the lateral compartment with moderate patellofemoral arthritis

## 2024-09-24 NOTE — DISCUSSION/SUMMARY
[de-identified] : I went over the pathophysiology of the patient's symptoms in great detail with the patient. I discussed the underlying pathophysiology of the patient's condition in great detail with the patient. I went over the patient's x-rays with them in great detail. Viscosupplementation was discussed as a solution to the patient's symptoms and a booklet with information was provided. She needs to avoid high-impact activities such as running and jumping or riding a treadmill. I recommend alternative activities such as riding a stationary bike or elliptical on low tension. She should focus on light weight and high repetition exercises. She should avoid squatting and kneeling.   All of their questions were answered. They understand and consent to the plan.   FU in 3-4 weeks for a right knee cortisone injection.

## 2024-09-24 NOTE — ADDENDUM
[FreeTextEntry1] : I, TIMMY MCALLISTER, acted solely as a scribe for Dr. Brad Castellano on this date 09/24/2024.  All medical record entries made by the Scribe were at my, Dr. Brad Castellano, direction and personally dictated by me on 09/24/2024. I have reviewed the chart and agree that the record accurately reflects my personal performance of the history, physical exam, assessment and plan. I have also personally directed, reviewed, and agreed with the chart.

## 2024-09-24 NOTE — HISTORY OF PRESENT ILLNESS
[de-identified] : Patient presents for initial office visit today, c/o left knee pain since 2010. Patient reports gradual onset of intermittent left knee pain, described as "aching" pain, rated 5/10 on the pain scale. Of note, patient reports history of left MCL sprain in 2010, but denies any recent falls, trauma, or other injuries. Patient states that pain is worst with increased activity. Patient admits to taking Tylenol, which provides little to no improvement in pain. Patient is not participating in PT, no recent injections. Patient ambulates without assistance and can perform ADL's independently. Patient otherwise states that she has been in her usual state of health. Patient denies any fever, chills, headache, dizziness, shortness of breath, chest pain, palpitations, abdominal pain, new onset urinary/bowel incontinence, saddle paresthesia, and any other acute complaints at this time.

## 2024-10-17 ENCOUNTER — APPOINTMENT (OUTPATIENT)
Dept: ORTHOPEDIC SURGERY | Facility: CLINIC | Age: 74
End: 2024-10-17